# Patient Record
Sex: FEMALE | Race: WHITE | Employment: OTHER | URBAN - METROPOLITAN AREA
[De-identification: names, ages, dates, MRNs, and addresses within clinical notes are randomized per-mention and may not be internally consistent; named-entity substitution may affect disease eponyms.]

---

## 2017-01-17 ENCOUNTER — LAB ENCOUNTER (OUTPATIENT)
Dept: LAB | Age: 72
End: 2017-01-17
Attending: OBSTETRICS & GYNECOLOGY
Payer: MEDICARE

## 2017-01-17 DIAGNOSIS — E03.9 HYPOTHYROIDISM (ACQUIRED): Primary | ICD-10-CM

## 2017-01-17 LAB
T3FREE SERPL-MCNC: 3.29 PG/ML (ref 2.53–4.29)
T4 FREE SERPL-MCNC: 0.86 NG/DL (ref 0.58–1.64)
TSH SERPL-ACNC: 3.53 UIU/ML (ref 0.34–5.6)

## 2017-01-17 PROCEDURE — 84481 FREE ASSAY (FT-3): CPT

## 2017-01-17 PROCEDURE — 84439 ASSAY OF FREE THYROXINE: CPT

## 2017-01-17 PROCEDURE — 36415 COLL VENOUS BLD VENIPUNCTURE: CPT

## 2017-01-17 PROCEDURE — 84443 ASSAY THYROID STIM HORMONE: CPT

## 2017-02-09 ENCOUNTER — HOSPITAL ENCOUNTER (OUTPATIENT)
Age: 72
Discharge: HOME | End: 2017-02-09
Attending: FAMILY MEDICINE
Payer: MEDICARE

## 2017-02-09 ENCOUNTER — APPOINTMENT (OUTPATIENT)
Dept: GENERAL RADIOLOGY | Age: 72
End: 2017-02-09
Attending: FAMILY MEDICINE
Payer: MEDICARE

## 2017-02-09 VITALS
RESPIRATION RATE: 19 BRPM | SYSTOLIC BLOOD PRESSURE: 143 MMHG | HEART RATE: 62 BPM | DIASTOLIC BLOOD PRESSURE: 80 MMHG | OXYGEN SATURATION: 98 % | HEIGHT: 67 IN | WEIGHT: 186 LBS | TEMPERATURE: 98 F | BODY MASS INDEX: 29.19 KG/M2

## 2017-02-09 DIAGNOSIS — J06.9 VIRAL UPPER RESPIRATORY TRACT INFECTION: Primary | ICD-10-CM

## 2017-02-09 PROCEDURE — 99213 OFFICE O/P EST LOW 20 MIN: CPT

## 2017-02-09 PROCEDURE — 99214 OFFICE O/P EST MOD 30 MIN: CPT

## 2017-02-09 PROCEDURE — 71020 XR CHEST PA + LAT CHEST (CPT=71020): CPT

## 2017-02-09 RX ORDER — BENZONATATE 100 MG/1
100 CAPSULE ORAL 3 TIMES DAILY PRN
Qty: 30 CAPSULE | Refills: 0 | Status: SHIPPED | OUTPATIENT
Start: 2017-02-09 | End: 2017-03-11

## 2017-02-09 RX ORDER — AZITHROMYCIN 250 MG/1
TABLET, FILM COATED ORAL
Qty: 1 PACKAGE | Refills: 0 | Status: SHIPPED | OUTPATIENT
Start: 2017-02-09 | End: 2017-02-14

## 2017-02-09 RX ORDER — LEVOTHYROXINE SODIUM 0.07 MG/1
TABLET ORAL
Refills: 0 | COMMUNITY
Start: 2017-01-20

## 2017-02-09 RX ORDER — ESTRADIOL 0.75 MG/1.25G
GEL, METERED TOPICAL
Refills: 1 | COMMUNITY
Start: 2017-02-03

## 2017-02-09 RX ORDER — CODEINE PHOSPHATE AND GUAIFENESIN 10; 100 MG/5ML; MG/5ML
5 SOLUTION ORAL EVERY 6 HOURS PRN
Qty: 118 ML | Refills: 0 | Status: SHIPPED | OUTPATIENT
Start: 2017-02-09 | End: 2019-02-01 | Stop reason: ALTCHOICE

## 2017-02-09 NOTE — ED PROVIDER NOTES
Patient Seen in: [address]    History   Patient presents with:  Cough/URI    Stated Complaint: feels warm congested muscle aches    HPI    Patient here with cough, congestion for 3-4 days.      Cough is productive of yello 19   Temp 02/09/17 1224 97.5 °F (36.4 °C)   Temp src 02/09/17 1224 Oral   SpO2 02/09/17 1224 98 %   O2 Device 02/09/17 1224 None (Room air)       Current:/80 mmHg  Pulse 62  Temp(Src) 97.5 °F (36.4 °C) (Oral)  Resp 19  Ht 170.2 cm (5' 7\")  Wt 84.369 spondylosis unchanged. OTHER: Negative. Chest x-ray was reviewed. There is no acute cardiopulmonary findings. 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   The patient was informed of these res cough.  Qty: 30 capsule Refills: 0    azithromycin (ZITHROMAX Z-MICHELLE) 250 MG Oral Tab  500 mg once followed by 250 mg daily x 4 days  Qty: 1 Package Refills: 0

## 2017-10-20 ENCOUNTER — HOSPITAL ENCOUNTER (OUTPATIENT)
Age: 72
Discharge: HOME | End: 2017-10-20
Attending: PEDIATRICS
Payer: MEDICARE

## 2017-10-20 VITALS
BODY MASS INDEX: 28.93 KG/M2 | SYSTOLIC BLOOD PRESSURE: 129 MMHG | OXYGEN SATURATION: 98 % | HEIGHT: 66 IN | TEMPERATURE: 99 F | RESPIRATION RATE: 20 BRPM | HEART RATE: 72 BPM | DIASTOLIC BLOOD PRESSURE: 76 MMHG | WEIGHT: 180 LBS

## 2017-10-20 DIAGNOSIS — J45.21 MILD INTERMITTENT REACTIVE AIRWAY DISEASE WITH ACUTE EXACERBATION: Primary | ICD-10-CM

## 2017-10-20 DIAGNOSIS — J06.9 UPPER RESPIRATORY TRACT INFECTION, UNSPECIFIED TYPE: ICD-10-CM

## 2017-10-20 DIAGNOSIS — H10.9 CONJUNCTIVITIS OF LEFT EYE, UNSPECIFIED CONJUNCTIVITIS TYPE: ICD-10-CM

## 2017-10-20 PROCEDURE — 99214 OFFICE O/P EST MOD 30 MIN: CPT

## 2017-10-20 PROCEDURE — 99213 OFFICE O/P EST LOW 20 MIN: CPT

## 2017-10-20 RX ORDER — POLYMYXIN B SULFATE AND TRIMETHOPRIM 1; 10000 MG/ML; [USP'U]/ML
1 SOLUTION OPHTHALMIC
Qty: 10 ML | Refills: 0 | Status: SHIPPED | OUTPATIENT
Start: 2017-10-20 | End: 2017-10-25

## 2017-10-20 RX ORDER — INHALER, ASSIST DEVICES
SPACER (EA) MISCELLANEOUS
Qty: 1 EACH | Refills: 0 | Status: SHIPPED | OUTPATIENT
Start: 2017-10-20

## 2017-10-20 RX ORDER — ALBUTEROL SULFATE 90 UG/1
2 AEROSOL, METERED RESPIRATORY (INHALATION) EVERY 4 HOURS PRN
Qty: 1 INHALER | Refills: 0 | Status: SHIPPED | OUTPATIENT
Start: 2017-10-20 | End: 2017-11-19

## 2017-10-20 RX ORDER — PREDNISONE 20 MG/1
40 TABLET ORAL DAILY
Qty: 10 TABLET | Refills: 0 | Status: SHIPPED | OUTPATIENT
Start: 2017-10-20 | End: 2017-10-25

## 2017-10-20 RX ORDER — CODEINE PHOSPHATE AND GUAIFENESIN 10; 100 MG/5ML; MG/5ML
5 SOLUTION ORAL EVERY 6 HOURS PRN
Qty: 120 ML | Refills: 0 | Status: SHIPPED | OUTPATIENT
Start: 2017-10-20 | End: 2019-02-01 | Stop reason: ALTCHOICE

## 2017-10-20 NOTE — ED INITIAL ASSESSMENT (HPI)
Patient is here with redness to her left eye along with drainage in the morning. She states she has had a congested productive cough since Monday with a fever on and off. She she states she has been coughing up greenish/gray stuff.

## 2017-10-20 NOTE — ED PROVIDER NOTES
Patient presents with:  Cough/URI      HPI:     Tonya Goyal is a 67year old female who presents for evaluation of a chief complaint of upper respiratory symptoms for 4 days.   She notes now some left eye redness yesterday in the addition of right eye 1 Inhaler          Refill:  0      Spacer/Aero-Holding Chambers (AEROCHAMBER PLUS) Does not apply Misc          Sig: As directed          Dispense:  1 each          Refill:  0      guaiFENesin-codeine 100-10 MG/5ML Oral Solution          Sig: Take 5 mL by

## 2019-02-01 ENCOUNTER — HOSPITAL ENCOUNTER (OUTPATIENT)
Age: 74
Discharge: HOME | End: 2019-02-01
Attending: FAMILY MEDICINE
Payer: MEDICARE

## 2019-02-01 VITALS
SYSTOLIC BLOOD PRESSURE: 131 MMHG | TEMPERATURE: 98 F | OXYGEN SATURATION: 98 % | DIASTOLIC BLOOD PRESSURE: 66 MMHG | HEART RATE: 78 BPM | RESPIRATION RATE: 20 BRPM

## 2019-02-01 DIAGNOSIS — J20.9 ACUTE BRONCHITIS, UNSPECIFIED ORGANISM: Primary | ICD-10-CM

## 2019-02-01 PROCEDURE — 99214 OFFICE O/P EST MOD 30 MIN: CPT

## 2019-02-01 PROCEDURE — 99213 OFFICE O/P EST LOW 20 MIN: CPT

## 2019-02-01 RX ORDER — AZITHROMYCIN 250 MG/1
TABLET, FILM COATED ORAL
Qty: 1 PACKAGE | Refills: 0 | Status: SHIPPED | OUTPATIENT
Start: 2019-02-01 | End: 2019-02-06

## 2019-02-01 RX ORDER — BENZONATATE 200 MG/1
200 CAPSULE ORAL 3 TIMES DAILY PRN
Qty: 21 CAPSULE | Refills: 0 | Status: SHIPPED | OUTPATIENT
Start: 2019-02-01 | End: 2019-02-08

## 2019-02-01 RX ORDER — CODEINE PHOSPHATE AND GUAIFENESIN 10; 100 MG/5ML; MG/5ML
10 SOLUTION ORAL NIGHTLY PRN
Qty: 100 ML | Refills: 0 | Status: SHIPPED | OUTPATIENT
Start: 2019-02-01 | End: 2019-02-11

## 2019-02-01 RX ORDER — ALBUTEROL SULFATE 90 UG/1
2 AEROSOL, METERED RESPIRATORY (INHALATION) 4 TIMES DAILY
Qty: 1 INHALER | Refills: 0 | Status: SHIPPED | OUTPATIENT
Start: 2019-02-01 | End: 2019-02-08

## 2019-02-01 NOTE — ED PROVIDER NOTES
Patient presents with:  Cough/URI      HPI:     Cali Cornejo is a 75year old female who presents with for chief complaint of chest congestion, cough with green brown phlegm, occ wheeze, body aches, subjective fevers   X 1 day.     The patient denies co sounds normal; no masses,  no organomegaly  Skin: Skin color, texture, turgor normal. No rashes or lesions      Assessment/Plan:     Diagnosis:    ICD-10-CM    1.  Acute bronchitis, unspecified organism J20.9        Orders Placed This Encounter      audelia

## 2020-02-12 ENCOUNTER — HOSPITAL ENCOUNTER (OUTPATIENT)
Dept: ULTRASOUND IMAGING | Age: 75
Discharge: HOME | End: 2020-02-12
Attending: OBSTETRICS & GYNECOLOGY
Payer: MEDICARE

## 2020-02-12 DIAGNOSIS — Z79.890 NEED FOR PROPHYLACTIC HORMONE REPLACEMENT THERAPY (POSTMENOPAUSAL): ICD-10-CM

## 2020-02-12 PROCEDURE — 76856 US EXAM PELVIC COMPLETE: CPT | Performed by: OBSTETRICS & GYNECOLOGY

## 2020-02-12 PROCEDURE — 76830 TRANSVAGINAL US NON-OB: CPT | Performed by: OBSTETRICS & GYNECOLOGY

## 2021-01-28 ENCOUNTER — LAB ENCOUNTER (OUTPATIENT)
Dept: LAB | Facility: HOSPITAL | Age: 76
End: 2021-01-28
Attending: OBSTETRICS & GYNECOLOGY
Payer: MEDICARE

## 2021-01-28 DIAGNOSIS — E03.9 HYPOTHYROIDISM: Primary | ICD-10-CM

## 2021-01-28 LAB
T4 FREE SERPL-MCNC: 0.7 NG/DL (ref 0.8–1.7)
T4 SERPL-MCNC: 6.6 UG/DL
TSI SER-ACNC: 7.25 MIU/ML (ref 0.36–3.74)

## 2021-01-28 PROCEDURE — 36415 COLL VENOUS BLD VENIPUNCTURE: CPT

## 2021-01-28 PROCEDURE — 84439 ASSAY OF FREE THYROXINE: CPT

## 2021-01-28 PROCEDURE — 84443 ASSAY THYROID STIM HORMONE: CPT

## 2021-05-31 ENCOUNTER — HOSPITAL ENCOUNTER (OUTPATIENT)
Age: 76
Discharge: HOME | End: 2021-05-31
Payer: MEDICARE

## 2021-05-31 VITALS
OXYGEN SATURATION: 98 % | RESPIRATION RATE: 16 BRPM | SYSTOLIC BLOOD PRESSURE: 142 MMHG | HEART RATE: 69 BPM | DIASTOLIC BLOOD PRESSURE: 80 MMHG | TEMPERATURE: 97 F

## 2021-05-31 DIAGNOSIS — M79.89 SWELLING OF LEFT HAND: Primary | ICD-10-CM

## 2021-05-31 PROCEDURE — 99203 OFFICE O/P NEW LOW 30 MIN: CPT | Performed by: NURSE PRACTITIONER

## 2021-05-31 NOTE — ED PROVIDER NOTES
Patient Seen in: [facility]    History   CC: hand swelling  HPI: Dylan Banuelos 67year old female  who presents c/o left palmar hand localized area of swelling which pt noticed \"over a year ago\" however seems to have gotten a little worse Resp 16   SpO2 98%         PE:  General - Appears well, non-toxic and in NAD  Head - Appears symmetrical without deformity/swelling cranium, scalp, or facial bones  Skin - +band-like area of subtle swelling w/o erythema noted to left palmar hand overlyin

## 2021-06-21 ENCOUNTER — OFFICE VISIT (OUTPATIENT)
Dept: SURGERY | Facility: CLINIC | Age: 76
End: 2021-06-21
Payer: MEDICARE

## 2021-06-21 DIAGNOSIS — M72.0 DUPUYTREN'S CONTRACTURE: Primary | ICD-10-CM

## 2021-06-21 PROCEDURE — 99203 OFFICE O/P NEW LOW 30 MIN: CPT | Performed by: PLASTIC SURGERY

## 2021-06-21 NOTE — H&P
Sienna Foreman is a 67year old female that presents with Patient presents with:  Pain:  Volar base of L thumb  .     REFERRED BY:  Archie Saul      Pacemaker: No  Latex Allergy: no  Coumadin: No  Plavix: No  Other anticoagulants: No  Cardiac stents: Reported on 5/31/2021 ) 50 g 0   • Spacer/Aero-Holding Chambers (AEROCHAMBER PLUS) Does not apply Misc As directed (Patient not taking: Reported on 5/31/2021 ) 1 each 0        SOCIAL HISTORY  Social History    Tobacco Use      Smoking status: Never Smoker RISK  LOW    (complications/ morbidity)    o                   MDM LEVEL    LOW

## 2021-12-24 ENCOUNTER — HOSPITAL ENCOUNTER (OUTPATIENT)
Age: 76
Discharge: HOME | End: 2021-12-24
Payer: MEDICARE

## 2021-12-24 VITALS
HEIGHT: 66 IN | DIASTOLIC BLOOD PRESSURE: 71 MMHG | WEIGHT: 165 LBS | RESPIRATION RATE: 18 BRPM | HEART RATE: 73 BPM | OXYGEN SATURATION: 99 % | BODY MASS INDEX: 26.52 KG/M2 | SYSTOLIC BLOOD PRESSURE: 134 MMHG

## 2021-12-24 DIAGNOSIS — Z20.822 ENCOUNTER FOR LABORATORY TESTING FOR COVID-19 VIRUS: Primary | ICD-10-CM

## 2021-12-24 PROCEDURE — 99212 OFFICE O/P EST SF 10 MIN: CPT | Performed by: NURSE PRACTITIONER

## 2021-12-24 PROCEDURE — U0002 COVID-19 LAB TEST NON-CDC: HCPCS | Performed by: NURSE PRACTITIONER

## 2021-12-24 NOTE — ED PROVIDER NOTES
Patient Seen in: [facility]    History   CC: covid testing  HPI: Lynne Britton 67year old female  who presents requesting Covid testing for travel. No symptoms or complaints. No known exposures. Has been vaccinated and boosted.      Past discharge noted, no periorbital edema  ENT - EAC bilaterally without discharge   Oropharynx clear, voice is clear  Neck - no significant adenopathy, supple with trachea midline  Skin - no rashes or petechiae noted, pink warm and dry throughout, mmm  Neuro

## 2023-11-03 ENCOUNTER — HOSPITAL ENCOUNTER (OUTPATIENT)
Age: 78
Discharge: HOME | End: 2023-11-03
Payer: MEDICARE

## 2023-11-03 VITALS
DIASTOLIC BLOOD PRESSURE: 81 MMHG | SYSTOLIC BLOOD PRESSURE: 142 MMHG | TEMPERATURE: 98 F | HEART RATE: 84 BPM | OXYGEN SATURATION: 99 % | RESPIRATION RATE: 20 BRPM

## 2023-11-03 DIAGNOSIS — B02.9 HERPES ZOSTER WITHOUT COMPLICATION: Primary | ICD-10-CM

## 2023-11-03 PROCEDURE — 99213 OFFICE O/P EST LOW 20 MIN: CPT | Performed by: NURSE PRACTITIONER

## 2023-11-03 RX ORDER — ACYCLOVIR 50 MG/G
1 CREAM TOPICAL
Qty: 5 G | Refills: 0 | Status: SHIPPED | OUTPATIENT
Start: 2023-11-03

## 2023-11-03 RX ORDER — VALACYCLOVIR HYDROCHLORIDE 1 G/1
1000 TABLET, FILM COATED ORAL 3 TIMES DAILY
Qty: 21 TABLET | Refills: 0 | Status: SHIPPED | OUTPATIENT
Start: 2023-11-03 | End: 2023-11-10

## 2023-11-03 NOTE — ED INITIAL ASSESSMENT (HPI)
Patient comes in complains of a rash on her L side back and upwards about 2 days now she noticed it.

## 2024-06-07 NOTE — ED INITIAL ASSESSMENT (HPI)
How Severe Are Your Spot(S)?: moderate Pt presents to the IC with c/o low grade fevers, muscle aches, cough and nasal congestion. +productive cough, light yellow. Aox4. Pt denies n/v. Pt thinks she may have the flu, but notes family members have been dx with bronchitis. What Type Of Note Output Would You Prefer (Optional)?: Standard Output What Is The Reason For Today's Visit?: Annual Full Body Skin Examination with No Concerns What Is The Reason For Today's Visit? (Being Monitored For X): concerning skin lesions on an annual basis Admission

## (undated) NOTE — LETTER
21      Patient: Stan Roberts  : 1945 Visit date: 2021    Dear Tito Walton,      I examined your patient in consultation today. She has an asymptomatic Dupuytren's nodule of the left thumb base.   Nothing need be done f

## (undated) NOTE — ED AVS SNAPSHOT
[facility] in [place] [address]    Phone:  [phone]    Fax:  [address]   MRN: [id number]    Department:  [facility] in [facility]   Date of Visit: other new or worsening symptoms please return to clinic or go to the ER immediately. If you are unable to get a follow-up appointment as instructed with your primary care doctor, please return to immediate care.     Cheratussin (Cough Syrup) can cause drow You were examined and treated today on an urgent basis only. This was not a substitute for ongoing medical care. Often, one Immediate Care visit does not uncover every injury or illness.  If you have been referred to a primary care or a specialist physicia jaelyn. [facility] Pharm[facility]        Pharmacy Address Phone Number   [facility] [address] [facility] ([address]) [address] [facility]) [phone]   [facility] [address]  ([address] If you have questions, you can call [phone] to talk to our [facility] Staff. Remember, [employer] is NOT to be used for urgent needs. For medical emergencies, dial 911.